# Patient Record
Sex: MALE | Race: WHITE
[De-identification: names, ages, dates, MRNs, and addresses within clinical notes are randomized per-mention and may not be internally consistent; named-entity substitution may affect disease eponyms.]

---

## 2018-06-27 ENCOUNTER — HOSPITAL ENCOUNTER (EMERGENCY)
Dept: HOSPITAL 1 - ED | Age: 33
Discharge: HOME | End: 2018-06-27
Payer: COMMERCIAL

## 2018-06-27 VITALS
WEIGHT: 142.99 LBS | WEIGHT: 142.99 LBS | HEIGHT: 67.01 IN | BODY MASS INDEX: 22.44 KG/M2 | HEIGHT: 67.01 IN | BODY MASS INDEX: 22.44 KG/M2

## 2018-06-27 VITALS — DIASTOLIC BLOOD PRESSURE: 71 MMHG | SYSTOLIC BLOOD PRESSURE: 135 MMHG

## 2018-06-27 DIAGNOSIS — H60.92: Primary | ICD-10-CM

## 2021-04-28 ENCOUNTER — HOSPITAL ENCOUNTER (EMERGENCY)
Dept: HOSPITAL 26 - MED | Age: 36
Discharge: HOME | End: 2021-04-28
Payer: COMMERCIAL

## 2021-04-28 VITALS — WEIGHT: 157 LBS | HEIGHT: 67 IN | BODY MASS INDEX: 24.64 KG/M2

## 2021-04-28 VITALS — DIASTOLIC BLOOD PRESSURE: 83 MMHG | SYSTOLIC BLOOD PRESSURE: 145 MMHG

## 2021-04-28 VITALS — SYSTOLIC BLOOD PRESSURE: 145 MMHG | DIASTOLIC BLOOD PRESSURE: 83 MMHG

## 2021-04-28 DIAGNOSIS — Z79.899: ICD-10-CM

## 2021-04-28 DIAGNOSIS — L02.411: Primary | ICD-10-CM

## 2021-04-28 NOTE — NUR
34 YO MALE BIB SELF FOR C/O ABCESS UNDER R ARMPIT. PT THINKS DEODERANT CAUSED 
IT BECAUSE ONCE HE STOPPED USING IT, THE PAIN WENT AWAY AND THE SIZE DECREASED. 
DENIES FEVER/N/V, AND OTC MEDS. PT USED HOT COMPRESS LAST NIGHT THAT HELPED 
WITH THE PAIN. PT DENIES PAIN AT THIS TIME. ABSCESS NOTED UNDER PAIN- RED AND 
SWOLLEN. PT A/O X4 WITH EVEN AND UNLABORED RESPIRATIONS. PT  LAYING IN BED WITH 
BED IN LOWEST POSITION, BRAKES LOCKED, X1 SIDERAIL UP. 



MED HX: DENIES 

NKA 

-------------------------------------------------------------------------------

Addendum: 04/28/21 at 1541 by [email or web address]

-------------------------------------------------------------------------------

PT STATES CYST HAS BEEN PRESENT X2 WEEKS.

## 2021-04-28 NOTE — NUR
Patient discharged with v/s stable. Written and verbal after care instructions 
given and explained. 

Patient alert, oriented and verbalized understanding of instructions. 
Ambulatory with steady gait. All questions addressed prior to discharge. ID 
band removed. Patient advised to follow up with PMD. Rx of NAPROXEN AND 
SULFAMETHOXAZOLE/TRIMETHOPRIM given. Patient educated on indication of 
medication including possible reaction and side effects. Opportunity to ask 
questions provided and answered.